# Patient Record
Sex: MALE | Race: BLACK OR AFRICAN AMERICAN | NOT HISPANIC OR LATINO | URBAN - METROPOLITAN AREA
[De-identification: names, ages, dates, MRNs, and addresses within clinical notes are randomized per-mention and may not be internally consistent; named-entity substitution may affect disease eponyms.]

---

## 2021-06-10 ENCOUNTER — OFFICE VISIT (OUTPATIENT)
Dept: URBAN - METROPOLITAN AREA CLINIC 82 | Facility: CLINIC | Age: 21
End: 2021-06-10
Payer: COMMERCIAL

## 2021-06-10 ENCOUNTER — LAB OUTSIDE AN ENCOUNTER (OUTPATIENT)
Dept: URBAN - METROPOLITAN AREA CLINIC 82 | Facility: CLINIC | Age: 21
End: 2021-06-10

## 2021-06-10 VITALS
SYSTOLIC BLOOD PRESSURE: 128 MMHG | WEIGHT: 140.8 LBS | HEART RATE: 57 BPM | HEIGHT: 71 IN | BODY MASS INDEX: 19.71 KG/M2 | TEMPERATURE: 97.6 F | DIASTOLIC BLOOD PRESSURE: 83 MMHG

## 2021-06-10 DIAGNOSIS — R63.4 WEIGHT LOSS: ICD-10-CM

## 2021-06-10 DIAGNOSIS — K21.9 GASTROESOPHAGEAL REFLUX DISEASE, UNSPECIFIED WHETHER ESOPHAGITIS PRESENT: ICD-10-CM

## 2021-06-10 DIAGNOSIS — Z83.71 FAMILY HISTORY OF COLON POLYPS: ICD-10-CM

## 2021-06-10 DIAGNOSIS — R93.5 ABNORMAL CT OF THE ABDOMEN: ICD-10-CM

## 2021-06-10 DIAGNOSIS — R11.15 CYCLICAL VOMITING SYNDROME NOT ASSOCIATED WITH MIGRAINE: ICD-10-CM

## 2021-06-10 PROBLEM — 89362005: Status: ACTIVE | Noted: 2021-06-10

## 2021-06-10 PROBLEM — 15634181000119107: Status: ACTIVE | Noted: 2021-06-10

## 2021-06-10 PROBLEM — 235595009: Status: ACTIVE | Noted: 2021-06-10

## 2021-06-10 PROBLEM — 429969003 FAMILY HISTORY OF POLYP OF COLON: Status: ACTIVE | Noted: 2021-06-10

## 2021-06-10 PROBLEM — 18773000: Status: ACTIVE | Noted: 2021-06-10

## 2021-06-10 PROCEDURE — 99204 OFFICE O/P NEW MOD 45 MIN: CPT | Performed by: INTERNAL MEDICINE

## 2021-06-10 NOTE — HPI-TODAY'S VISIT:
SERINA MOTHER IS HERE.   SEEING GI BEFORE  STILL HAVING STOMACH PROBLEM, STARTED ONE YEAR AGO. POST PRANDIAL VOMITING, THEN HE HAS PAIN  DIFFUSE ABDOMINAL PAIN. THROW UP, FOOD. AND BILE. NO BLOOD  VOMITING FOR 1 YEARS  VOMITING ?NOT ALL WAYS, ?EXCESSIVE OR SPECIFIC FOOD,, HAPPENS SPORADICALLY.  PAIN IS NOT ALWAY AFTER VOMITING.   FIRST NAUSE  THEN VOMITING.   BM IS FINE, MOSTLY. NO BLEEDING   HAD EGD. NOTHING. [facility] GASTRO APRIL 7 TH, FOUND NEGATIVE.   CYCLIC VOMITING SYNDROME. WAS DIAGNOSED.   PATIENT DOES NOT KNOW WHAT THEY FOUND.   H PYLORI NEG.   MOTHER GIVES HISTORY.   WT LOSS , 20LBS 7 MONTHS.  ZOFRAN HELPS.   OMPERPAZOLE NOT ALWAY.   SINGLE, SMOKE MARIJUANA, NO TOBACCO.   [facility]  CT SCAN DONE AT [facility]. PATIENT WAS NORMAL.

## 2021-06-28 ENCOUNTER — DASHBOARD ENCOUNTERS (OUTPATIENT)
Age: 21
End: 2021-06-28

## 2021-07-01 ENCOUNTER — OFFICE VISIT (OUTPATIENT)
Dept: URBAN - METROPOLITAN AREA CLINIC 82 | Facility: CLINIC | Age: 21
End: 2021-07-01